# Patient Record
Sex: FEMALE | Race: WHITE | NOT HISPANIC OR LATINO | Employment: STUDENT | URBAN - METROPOLITAN AREA
[De-identification: names, ages, dates, MRNs, and addresses within clinical notes are randomized per-mention and may not be internally consistent; named-entity substitution may affect disease eponyms.]

---
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
## 2024-05-06 ENCOUNTER — OFFICE VISIT (OUTPATIENT)
Dept: OBSTETRICS AND GYNECOLOGY | Facility: CLINIC | Age: 19
End: 2024-05-06
Payer: COMMERCIAL

## 2024-05-06 VITALS
BODY MASS INDEX: 23.57 KG/M2 | DIASTOLIC BLOOD PRESSURE: 79 MMHG | HEART RATE: 65 BPM | WEIGHT: 133 LBS | HEIGHT: 63 IN | SYSTOLIC BLOOD PRESSURE: 120 MMHG

## 2024-05-06 DIAGNOSIS — Z01.419 WELL WOMAN EXAM WITH ROUTINE GYNECOLOGICAL EXAM: Primary | ICD-10-CM

## 2024-05-06 DIAGNOSIS — N94.6 DYSMENORRHEA: ICD-10-CM

## 2024-05-06 DIAGNOSIS — N93.9 ABNORMAL UTERINE BLEEDING (AUB): ICD-10-CM

## 2024-05-06 PROCEDURE — 99395 PREV VISIT EST AGE 18-39: CPT | Performed by: OBSTETRICS & GYNECOLOGY

## 2024-05-06 RX ORDER — NORETHINDRONE ACETATE AND ETHINYL ESTRADIOL 1MG-20(21)
1 KIT ORAL DAILY
Qty: 84 TABLET | Refills: 2 | Status: SHIPPED | OUTPATIENT
Start: 2024-05-06

## 2025-06-02 NOTE — PROGRESS NOTES
"Chief Complaint   Patient presents with    Annual Exam       HPI:   19 y.o. . Presents for well woman exam. Contraception:  Birth control pill  Menses:   q monthly, lasts 4-5 days, changes regular tampon q 4hrs on heaviest days.   Pain:  None  Last pap: Not of age   Complaints: Pt has no complaints today.    Past Medical History:   Diagnosis Date    Anxiety       Past Surgical History:   Procedure Laterality Date    FINGER SURGERY Left 2022      Family History   Problem Relation Age of Onset    Prostate cancer Paternal Grandfather     Skin cancer Paternal Grandfather     Ovarian cancer Neg Hx     Uterine cancer Neg Hx     Breast cancer Neg Hx     Colon cancer Neg Hx     Clotting disorder Neg Hx     Deep vein thrombosis Neg Hx     Pulmonary embolism Neg Hx      Allergies as of 2025    (No Known Allergies)        PCP: does manage PMHx and preventative labs    /89   Pulse 83   Ht 160 cm (62.99\")   Wt 63.5 kg (140 lb)   LMP 2025 (Exact Date)   SpO2 99%   BMI 24.81 kg/m²   Physical Exam   Chaperone present   General- NAD, alert and oriented, appropriate  Psych- Normal mood, good memory  Neck- No masses, no thyroid enlargement  Lymphatic- No palpable neck nodes  CV- Regular rhythm, no murmurs  Resp- CTA to bases, no wheezes  Ext- No edema, no cyanosis    Skin- No lesions, no rashes, no acanthosis nigricans    ASSESSMENT and PLAN:    Diagnoses and all orders for this visit:    1. Well woman exam (Primary)    2. Screen for STD (sexually transmitted disease)  -     Chlamydia trachomatis, Neisseria gonorrhoeae, PCR - Urine, Urine, Random Void    3. Encounter for surveillance of contraceptive pills  -     Discontinue: norethindrone-ethinyl estradiol FE (Madisyn FE /) 1-20 MG-MCG per tablet; Take 1 tablet by mouth Daily.  Dispense: 84 tablet; Refill: 2  -     norethindrone-ethinyl estradiol FE (Madisyn FE /) 1-20 MG-MCG per tablet; Take 1 tablet by mouth Daily.  Dispense: 84 tablet; Refill: " 4      Preventative:   BREAST HEALTH- Monthly self breast exam importance and how to reviewed. MMG and/or MRI (prn) reviewed per society guidelines and her individual history. Screening Imaging: Not medically needed  CERVICAL CANCER Screening- Reviewed current ASCCP guidelines for screening w and wo cotest HPV, age specific.  Screen: next year at 21  SEXUAL HEALTH: STD screening desired.  Ordered,  Safe sex and condoms  VACCINATIONS Recommended: COVID and booster PRN, Flu annually  Importance discussed, risk being unvaccinated reviewed.  Questions answered  Genetic testing: Does not qualify.  Smoking status- NON SMOKER  Follow up PCP/Specialist PMHx and Labs  TRACK MENSES, RTO if <q21d, >7d long, heavy or painful.    All BIRTH CONTROL options R/B/A/SE/E of each reviewed in detail.  CHC/hormone use risk THROMBOEMBOLIC RISK reviewed.     SAFE SEX/condoms importance reviewed.        Follow Up:  Return in about 1 year (around 6/20/2026).        Cammy Couch, APRN  06/20/2025

## 2025-06-20 ENCOUNTER — OFFICE VISIT (OUTPATIENT)
Dept: OBSTETRICS AND GYNECOLOGY | Age: 20
End: 2025-06-20
Payer: COMMERCIAL

## 2025-06-20 VITALS
BODY MASS INDEX: 24.8 KG/M2 | OXYGEN SATURATION: 99 % | HEART RATE: 83 BPM | WEIGHT: 140 LBS | HEIGHT: 63 IN | SYSTOLIC BLOOD PRESSURE: 114 MMHG | DIASTOLIC BLOOD PRESSURE: 89 MMHG

## 2025-06-20 DIAGNOSIS — Z01.419 WELL WOMAN EXAM: Primary | ICD-10-CM

## 2025-06-20 DIAGNOSIS — Z30.41 ENCOUNTER FOR SURVEILLANCE OF CONTRACEPTIVE PILLS: ICD-10-CM

## 2025-06-20 DIAGNOSIS — Z11.3 SCREEN FOR STD (SEXUALLY TRANSMITTED DISEASE): ICD-10-CM

## 2025-06-20 LAB
C TRACH DNA SPEC QL NAA+PROBE: NOT DETECTED
N GONORRHOEA RRNA SPEC QL NAA+PROBE: NOT DETECTED

## 2025-06-20 PROCEDURE — 87491 CHLMYD TRACH DNA AMP PROBE: CPT | Performed by: NURSE PRACTITIONER

## 2025-06-20 PROCEDURE — 87591 N.GONORRHOEAE DNA AMP PROB: CPT | Performed by: NURSE PRACTITIONER

## 2025-06-20 RX ORDER — NORETHINDRONE ACETATE AND ETHINYL ESTRADIOL 1MG-20(21)
1 KIT ORAL DAILY
Qty: 84 TABLET | Refills: 4 | Status: SHIPPED | OUTPATIENT
Start: 2025-06-20

## 2025-06-20 RX ORDER — NORETHINDRONE ACETATE AND ETHINYL ESTRADIOL 1MG-20(21)
1 KIT ORAL DAILY
Qty: 84 TABLET | Refills: 2 | Status: SHIPPED | OUTPATIENT
Start: 2025-06-20 | End: 2025-06-20